# Patient Record
Sex: MALE | Race: WHITE | NOT HISPANIC OR LATINO | URBAN - METROPOLITAN AREA
[De-identification: names, ages, dates, MRNs, and addresses within clinical notes are randomized per-mention and may not be internally consistent; named-entity substitution may affect disease eponyms.]

---

## 2019-11-27 ENCOUNTER — OFFICE (OUTPATIENT)
Dept: URBAN - METROPOLITAN AREA CLINIC 45 | Facility: CLINIC | Age: 64
End: 2019-11-27

## 2019-11-27 VITALS
DIASTOLIC BLOOD PRESSURE: 87 MMHG | HEART RATE: 78 BPM | SYSTOLIC BLOOD PRESSURE: 128 MMHG | HEIGHT: 69 IN | WEIGHT: 190 LBS

## 2019-11-27 DIAGNOSIS — R94.5: ICD-10-CM

## 2019-11-27 DIAGNOSIS — M54.5: ICD-10-CM

## 2019-11-27 DIAGNOSIS — D64.9 ANEMIA, UNSPECIFIED: ICD-10-CM

## 2019-11-27 DIAGNOSIS — E66.3 OVERWEIGHT: ICD-10-CM

## 2019-11-27 DIAGNOSIS — R14.0 ABDOMINAL BLOATING: ICD-10-CM

## 2019-11-27 DIAGNOSIS — R10.30: ICD-10-CM

## 2019-11-27 PROCEDURE — 99205 OFFICE O/P NEW HI 60 MIN: CPT | Performed by: INTERNAL MEDICINE

## 2019-11-27 RX ORDER — DICYCLOMINE HYDROCHLORIDE 20 MG/1
TABLET ORAL
Qty: 60 | Status: ACTIVE

## 2019-11-27 NOTE — SERVICEHPINOTES
The patient complains of abdominal pain.    Symptoms began fairly acutely and developed gradually over last   several  weeks   ago following lower back surgery performed on October 30, with onset that was    gradual  .    It is localized as mostly right   lower abdomen and periumbilical area   pain.    It is described as   mild or moderate   in nature.   Pain quality is described as mainly   dull and constant  .    It also at times radiates to the   back  .    Discomfort typically lasts   many  minutes   , and has a course which is   .   It usually starts   intermittently  .    Symptom triggers include   positional/posture change  .  Alleviating factors include   nothing specific  .    Symptoms have been overall   non-progressive/stable   since onset.    Alarm features noted:   none  .   The patient also reports a sensation of   abdominal bloating/distension. Patient developed acute alteration of bowel habit and constipation following surgery  .      Symptoms have caused the patient to   see primary care physician and visit the ER  , where he had fairly noncontributory lab work, except slight elevation of transaminases and mild anemia with hemoglobin of 11. Patient has had CT scan of the abdomen done with contrasts, which only demonstrated nonspecific fluid around slightly dilated small intestine suggestive of postoperative ileus.    Similar symptoms    occurred in the past, associated with    .   Noteworthy prior abdominal interventions include recent recurrent   lower back surgery for herniated disc disease  .   has been performed previously to evaluate the patient's symptoms.   Remedies tried to date include     with    .    Other pertinent testing to date includes,    There has been no previous colon screening. The patient has no family history of colon cancer or other significant GI diseases. Patient denies fever, nausea, vomiting, dysphagia, reflux, diarrhea, rectal bleeding, melena, and significant change in weight. Denies shortness of breath and chest pain.

## 2019-11-29 LAB
CBC (INCLUDES DIFF/PLT): ABSOLUTE BAND NEUTROPHILS: NORMAL CELLS/UL
CBC (INCLUDES DIFF/PLT): ABSOLUTE BASOPHILS: 53 CELLS/UL (ref 0–200)
CBC (INCLUDES DIFF/PLT): ABSOLUTE BLASTS: NORMAL CELLS/UL
CBC (INCLUDES DIFF/PLT): ABSOLUTE EOSINOPHILS: 238 CELLS/UL (ref 15–500)
CBC (INCLUDES DIFF/PLT): ABSOLUTE LYMPHOCYTES: 2020 CELLS/UL (ref 850–3900)
CBC (INCLUDES DIFF/PLT): ABSOLUTE METAMYELOCYTES: NORMAL CELLS/UL
CBC (INCLUDES DIFF/PLT): ABSOLUTE MONOCYTES: 667 CELLS/UL (ref 200–950)
CBC (INCLUDES DIFF/PLT): ABSOLUTE MYELOCYTES: NORMAL CELLS/UL
CBC (INCLUDES DIFF/PLT): ABSOLUTE NEUTROPHILS: 3623 CELLS/UL (ref 1500–7800)
CBC (INCLUDES DIFF/PLT): ABSOLUTE NUCLEATED RBC: 0 CELLS/UL (ref 0–?)
CBC (INCLUDES DIFF/PLT): ABSOLUTE PROMYELOCYTES: NORMAL CELLS/UL
CBC (INCLUDES DIFF/PLT): BAND NEUTROPHILS: NORMAL %
CBC (INCLUDES DIFF/PLT): BASOPHILS: 0.8 %
CBC (INCLUDES DIFF/PLT): BLASTS: NORMAL %
CBC (INCLUDES DIFF/PLT): COMMENT(S): NORMAL
CBC (INCLUDES DIFF/PLT): EOSINOPHILS: 3.6 %
CBC (INCLUDES DIFF/PLT): HEMATOCRIT: 36.1 % — LOW (ref 38.5–50)
CBC (INCLUDES DIFF/PLT): HEMOGLOBIN: 12.4 G/DL — LOW (ref 13.2–17.1)
CBC (INCLUDES DIFF/PLT): LYMPHOCYTES: 30.6 %
CBC (INCLUDES DIFF/PLT): MCH: 29.3 PG (ref 27–33)
CBC (INCLUDES DIFF/PLT): MCHC: 34.3 G/DL (ref 32–36)
CBC (INCLUDES DIFF/PLT): MCV: 85.3 FL (ref 80–100)
CBC (INCLUDES DIFF/PLT): METAMYELOCYTES: NORMAL %
CBC (INCLUDES DIFF/PLT): MONOCYTES: 10.1 %
CBC (INCLUDES DIFF/PLT): MPV: 10 FL (ref 7.5–12.5)
CBC (INCLUDES DIFF/PLT): MYELOCYTES: NORMAL %
CBC (INCLUDES DIFF/PLT): NEUTROPHILS: 54.9 %
CBC (INCLUDES DIFF/PLT): NUCLEATED RBC: NORMAL /100 WBC
CBC (INCLUDES DIFF/PLT): PLATELET COUNT: 306 THOUSAND/UL (ref 140–400)
CBC (INCLUDES DIFF/PLT): PROMYELOCYTES: NORMAL %
CBC (INCLUDES DIFF/PLT): RDW: 13 % (ref 11–15)
CBC (INCLUDES DIFF/PLT): REACTIVE LYMPHOCYTES: NORMAL %
CBC (INCLUDES DIFF/PLT): RED BLOOD CELL COUNT: 4.23 MILLION/UL (ref 4.2–5.8)
CBC (INCLUDES DIFF/PLT): WHITE BLOOD CELL COUNT: 6.6 THOUSAND/UL (ref 3.8–10.8)
CEA: <0.5 NG/ML
COMPREHENSIVE METABOLIC PANEL: ALBUMIN/GLOBULIN RATIO: 1.2 (CALC) (ref 1–2.5)
COMPREHENSIVE METABOLIC PANEL: ALBUMIN: 4 G/DL (ref 3.6–5.1)
COMPREHENSIVE METABOLIC PANEL: ALKALINE PHOSPHATASE: 217 U/L — HIGH (ref 40–115)
COMPREHENSIVE METABOLIC PANEL: ALT: 38 U/L (ref 9–46)
COMPREHENSIVE METABOLIC PANEL: AST: 20 U/L (ref 10–35)
COMPREHENSIVE METABOLIC PANEL: BILIRUBIN, TOTAL: 0.4 MG/DL (ref 0.2–1.2)
COMPREHENSIVE METABOLIC PANEL: BUN/CREATININE RATIO: (no result) (CALC)
COMPREHENSIVE METABOLIC PANEL: CALCIUM: 9.3 MG/DL (ref 8.6–10.3)
COMPREHENSIVE METABOLIC PANEL: CARBON DIOXIDE: 28 MMOL/L (ref 20–32)
COMPREHENSIVE METABOLIC PANEL: CHLORIDE: 107 MMOL/L (ref 98–110)
COMPREHENSIVE METABOLIC PANEL: CREATININE: 0.8 MG/DL (ref 0.7–1.25)
COMPREHENSIVE METABOLIC PANEL: EGFR AFRICAN AMERICAN: 109 ML/MIN/1.73M2 (ref 60–?)
COMPREHENSIVE METABOLIC PANEL: EGFR NON-AFR. AMERICAN: 94 ML/MIN/1.73M2 (ref 60–?)
COMPREHENSIVE METABOLIC PANEL: GLOBULIN: 3.3 G/DL (CALC) (ref 1.9–3.7)
COMPREHENSIVE METABOLIC PANEL: GLUCOSE: 102 MG/DL (ref 65–139)
COMPREHENSIVE METABOLIC PANEL: POTASSIUM: 4.8 MMOL/L (ref 3.5–5.3)
COMPREHENSIVE METABOLIC PANEL: PROTEIN, TOTAL: 7.3 G/DL (ref 6.1–8.1)
COMPREHENSIVE METABOLIC PANEL: SODIUM: 142 MMOL/L (ref 135–146)
COMPREHENSIVE METABOLIC PANEL: UREA NITROGEN (BUN): 11 MG/DL (ref 7–25)
FERRITIN: 63 NG/ML (ref 24–380)
HEPATITIS PANEL, ACUTE W/REFLEX TO CONFIRMATION: CONFIRMATION: NORMAL
HEPATITIS PANEL, ACUTE W/REFLEX TO CONFIRMATION: HEPATITIS A IGM: NORMAL
HEPATITIS PANEL, ACUTE W/REFLEX TO CONFIRMATION: HEPATITIS B CORE ANTIBODY (IGM): NORMAL
HEPATITIS PANEL, ACUTE W/REFLEX TO CONFIRMATION: HEPATITIS B SURFACE ANTIGEN: NORMAL
HEPATITIS PANEL, ACUTE W/REFLEX TO CONFIRMATION: HEPATITIS C ANTIBODY: NORMAL
HEPATITIS PANEL, ACUTE W/REFLEX TO CONFIRMATION: SIGNAL TO CUT-OFF: 0.03 (ref ?–1)
IRON AND TOTAL IRON BINDING CAPACITY: % SATURATION: 14 % (CALC) — LOW (ref 20–48)
IRON AND TOTAL IRON BINDING CAPACITY: IRON BINDING CAPACITY: 337 MCG/DL (CALC) (ref 250–425)
IRON AND TOTAL IRON BINDING CAPACITY: IRON, TOTAL: 46 MCG/DL — LOW (ref 50–180)
VITAMIN B12: 590 PG/ML (ref 200–1100)

## 2019-12-12 ENCOUNTER — HOSPITAL ENCOUNTER (EMERGENCY)
Dept: HOSPITAL 72 - EMR | Age: 64
Discharge: HOME | End: 2019-12-12
Payer: MEDICARE

## 2019-12-12 VITALS — DIASTOLIC BLOOD PRESSURE: 72 MMHG | SYSTOLIC BLOOD PRESSURE: 119 MMHG

## 2019-12-12 VITALS — SYSTOLIC BLOOD PRESSURE: 156 MMHG | DIASTOLIC BLOOD PRESSURE: 51 MMHG

## 2019-12-12 VITALS — HEIGHT: 70 IN | BODY MASS INDEX: 27.2 KG/M2 | WEIGHT: 190 LBS

## 2019-12-12 DIAGNOSIS — Z98.1: ICD-10-CM

## 2019-12-12 DIAGNOSIS — R55: Primary | ICD-10-CM

## 2019-12-12 LAB
ADD MANUAL DIFF: NO
ALBUMIN SERPL-MCNC: 3.1 G/DL (ref 3.4–5)
ALBUMIN/GLOB SERPL: 0.8 {RATIO} (ref 1–2.7)
ALP SERPL-CCNC: 206 U/L (ref 46–116)
ALT SERPL-CCNC: 40 U/L (ref 12–78)
ANION GAP SERPL CALC-SCNC: 9 MMOL/L (ref 5–15)
APPEARANCE UR: CLEAR
APTT PPP: YELLOW S
AST SERPL-CCNC: 22 U/L (ref 15–37)
BASOPHILS NFR BLD AUTO: 0.7 % (ref 0–2)
BILIRUB SERPL-MCNC: 0.2 MG/DL (ref 0.2–1)
BUN SERPL-MCNC: 13 MG/DL (ref 7–18)
CALCIUM SERPL-MCNC: 8.5 MG/DL (ref 8.5–10.1)
CHLORIDE SERPL-SCNC: 107 MMOL/L (ref 98–107)
CK MB SERPL-MCNC: 0.5 NG/ML (ref 0–3.6)
CK SERPL-CCNC: 46 U/L (ref 26–308)
CO2 SERPL-SCNC: 24 MMOL/L (ref 21–32)
CREAT SERPL-MCNC: 0.8 MG/DL (ref 0.55–1.3)
EOSINOPHIL NFR BLD AUTO: 1.6 % (ref 0–3)
ERYTHROCYTE [DISTWIDTH] IN BLOOD BY AUTOMATED COUNT: 11.2 % (ref 11.6–14.8)
GLOBULIN SER-MCNC: 3.8 G/DL
GLUCOSE UR STRIP-MCNC: NEGATIVE MG/DL
HCT VFR BLD CALC: 35.6 % (ref 42–52)
HGB BLD-MCNC: 12.6 G/DL (ref 14.2–18)
KETONES UR QL STRIP: NEGATIVE
LEUKOCYTE ESTERASE UR QL STRIP: NEGATIVE
LYMPHOCYTES NFR BLD AUTO: 22.2 % (ref 20–45)
MCV RBC AUTO: 85 FL (ref 80–99)
MONOCYTES NFR BLD AUTO: 7.3 % (ref 1–10)
NEUTROPHILS NFR BLD AUTO: 68.2 % (ref 45–75)
NITRITE UR QL STRIP: NEGATIVE
PH UR STRIP: 6.5 [PH] (ref 4.5–8)
PLATELET # BLD: 337 K/UL (ref 150–450)
POTASSIUM SERPL-SCNC: 4.8 MMOL/L (ref 3.5–5.1)
PROT UR QL STRIP: NEGATIVE
RBC # BLD AUTO: 4.2 M/UL (ref 4.7–6.1)
SODIUM SERPL-SCNC: 140 MMOL/L (ref 136–145)
SP GR UR STRIP: 1.01 (ref 1–1.03)
UROBILINOGEN UR-MCNC: 1 MG/DL (ref 0–1)
WBC # BLD AUTO: 7.9 K/UL (ref 4.8–10.8)

## 2019-12-12 PROCEDURE — 81003 URINALYSIS AUTO W/O SCOPE: CPT

## 2019-12-12 PROCEDURE — 82553 CREATINE MB FRACTION: CPT

## 2019-12-12 PROCEDURE — 99284 EMERGENCY DEPT VISIT MOD MDM: CPT

## 2019-12-12 PROCEDURE — 36415 COLL VENOUS BLD VENIPUNCTURE: CPT

## 2019-12-12 PROCEDURE — 85025 COMPLETE CBC W/AUTO DIFF WBC: CPT

## 2019-12-12 PROCEDURE — 80053 COMPREHEN METABOLIC PANEL: CPT

## 2019-12-12 PROCEDURE — 83605 ASSAY OF LACTIC ACID: CPT

## 2019-12-12 PROCEDURE — 82550 ASSAY OF CK (CPK): CPT

## 2019-12-12 PROCEDURE — 87040 BLOOD CULTURE FOR BACTERIA: CPT

## 2019-12-12 PROCEDURE — 84484 ASSAY OF TROPONIN QUANT: CPT

## 2019-12-12 PROCEDURE — 93005 ELECTROCARDIOGRAM TRACING: CPT

## 2019-12-12 PROCEDURE — 71045 X-RAY EXAM CHEST 1 VIEW: CPT

## 2019-12-12 PROCEDURE — 96360 HYDRATION IV INFUSION INIT: CPT

## 2019-12-12 NOTE — NUR
ED Nurse Note:



pt brought in to ER from doctor's office by ambulance due to near syncope and 
sudden dizziness. pt went to doctor's office for 6 weeks post op follow up of 
spinal surgery and had sudden dizziness and near syncope and called 911. per 
EMS systolic was lower than 100 at the field but it went up to 110 upon arrival 
at triage. pt aao x4 and ambulatory but bedridden at this moment due to 
fatigue. calm and cooperative. skin clean and intact. no cardiac or pulmonary 
distress noted. BP noted at 119/72mmHg at bedside. pt is in gown and on cardiac 
monitor.

## 2019-12-12 NOTE — NUR
ER DISCHARGE NOTE:

Patient is cleared to be discharged home per ERMD, pt is aox4, on room air, 
with stable vital signs. pt was given dc instructions, no medications 
prescribed, pt was able to verbalize understanding, pt id band and iv site 
removed without complications. pt left ER in stable condition in a wheelchair 
accompanied by one ER tech. pt took all belongings.

## 2019-12-12 NOTE — EMERGENCY ROOM REPORT
History of Present Illness


General


Chief Complaint:  Dizziness


Source:  Patient





Present Illness


HPI


Patient presents with episode of lightheadedness and near syncopal episode


Patient was at his back specialist office patient is 6 weeks out from major low 

back fusion/revision surgery


Patient was reportedly walking around when he became lightheaded weak


Blood pressure was found to be low in the 80s


Patient also had become diaphoretic


And paramedics were summoned


Patient also has comorbidities including cardiac disease with previous


ACS and stent placement





Upon arrival denies any chest pain denies any nausea


Allergies:  


Coded Allergies:  


     No Known Allergies (Unverified , 12/12/19)





Patient History


Past Medical History:  see triage record


Reviewed Nursing Documentation:  PMH: Agreed; PSxH: Agreed





Nursing Documentation-PMH


Hx Cardiac Problems:  Yes - stent surgery in 2017, spinal surgery 6 weeks ago





Review of Systems


All Other Systems:  negative except mentioned in HPI





Physical Exam





Vital Signs








  Date Time  Temp Pulse Resp B/P (MAP) Pulse Ox O2 Delivery O2 Flow Rate FiO2


 


12/12/19 14:32 98.6 68 17 113/76 (88) 98 Room Air  








Sp02 EP Interpretation:  reviewed, normal


General Appearance:  no apparent distress


Head:  normocephalic, atraumatic


Eyes:  bilateral eye PERRL, bilateral eye EOMI


ENT:  EOM grossly intact


Neck:  supple, thyroid normal


Respiratory:  lungs clear, no respiratory distress, no retraction


Cardiovascular #1:  regular rate, rhythm


Gastrointestinal:  non tender, soft


Musculoskeletal:  normal inspection


Neurologic:  alert, oriented x3


Psychiatric:  normal inspection


Skin:  no rash, palpation normal


Lymphatic:  no adenopathy





Medical Decision Making


Diagnostic Impression:  


 Primary Impression:  


 Near syncope


ER Course


Patient was having a follow-up for his multilevel revision back surgery


Family reports that the patient had been walking and standing longer than usual 

recently patient does report feeling lightheaded and diaphoretic


And from the reports


Sounds to have had a near syncopal episode patient denies any full lapse of 

consciousness








Multiple differentials are consideration including but not limited to cardiac 

cardiopulmonary,, infectious, vascular process patient at this time is 

hemodynamically stable


Initially was found to be hypotensive and with a small bolus of fluids has been 

over 100 systolic








Patient observed for prolonged period time I did make contact with the patient'

s surgical specialist they do not feel that this is likely related to any 

surgical intervention





Patient does have cardiac risk factors and previous history at this time


Of blood work returning at baseline levels EKG is normal


On repeat eval patient continues to feel well





We discussed possible further inpatient observation versus outpatient care 

patient reports that he feels well and comfortable with outpatient follow-up








Surgical specialist PA / Rn contacted at [phone]





Labs








Test


  12/12/19


15:20 12/12/19


15:25 12/12/19


16:10


 


Sodium Level


  140 MMOL/L


(136-145) 


  


 


 


Potassium Level


  4.8 MMOL/L


(3.5-5.1) 


  


 


 


Chloride Level


  107 MMOL/L


() 


  


 


 


Carbon Dioxide Level


  24 MMOL/L


(21-32) 


  


 


 


Anion Gap


  9 mmol/L


(5-15) 


  


 


 


Blood Urea Nitrogen


  13 mg/dL


(7-18) 


  


 


 


Creatinine


  0.8 MG/DL


(0.55-1.30) 


  


 


 


Estimat Glomerular Filtration


Rate > 60 mL/min


(>60) 


  


 


 


Glucose Level


  156 MG/DL


() 


  


 


 


Lactic Acid Level


  1.30 mmol/L


(0.4-2.0) 


  


 


 


Calcium Level


  8.5 MG/DL


(8.5-10.1) 


  


 


 


Total Bilirubin


  0.2 MG/DL


(0.2-1.0) 


  


 


 


Aspartate Amino Transf


(AST/SGOT) 22 U/L (15-37) 


  


  


 


 


Alanine Aminotransferase


(ALT/SGPT) 40 U/L (12-78) 


  


  


 


 


Alkaline Phosphatase


  206 U/L


() 


  


 


 


Total Creatine Kinase


  46 U/L


() 


  


 


 


Creatine Kinase MB


  0.5 NG/ML


(0.0-3.6) 


  


 


 


Creatine Kinase MB Relative


Index 1.0 


  


  


 


 


Troponin I


  0.000 ng/mL


(0.000-0.056) 


  


 


 


Total Protein


  6.9 G/DL


(6.4-8.2) 


  


 


 


Albumin


  3.1 G/DL


(3.4-5.0) 


  


 


 


Globulin 3.8 g/dL   


 


Albumin/Globulin Ratio 0.8 (1.0-2.7)   


 


White Blood Count


  


  7.9 K/UL


(4.8-10.8) 


 


 


Red Blood Count


  


  4.20 M/UL


(4.70-6.10) 


 


 


Hemoglobin


  


  12.6 G/DL


(14.2-18.0) 


 


 


Hematocrit


  


  35.6 %


(42.0-52.0) 


 


 


Mean Corpuscular Volume  85 FL (80-99)  


 


Mean Corpuscular Hemoglobin


  


  29.9 PG


(27.0-31.0) 


 


 


Mean Corpuscular Hemoglobin


Concent 


  35.3 G/DL


(32.0-36.0) 


 


 


Red Cell Distribution Width


  


  11.2 %


(11.6-14.8) 


 


 


Platelet Count


  


  337 K/UL


(150-450) 


 


 


Mean Platelet Volume


  


  5.7 FL


(6.5-10.1) 


 


 


Neutrophils (%) (Auto)


  


  68.2 %


(45.0-75.0) 


 


 


Lymphocytes (%) (Auto)


  


  22.2 %


(20.0-45.0) 


 


 


Monocytes (%) (Auto)


  


  7.3 %


(1.0-10.0) 


 


 


Eosinophils (%) (Auto)


  


  1.6 %


(0.0-3.0) 


 


 


Basophils (%) (Auto)


  


  0.7 %


(0.0-2.0) 


 


 


Urine Color   Yellow 


 


Urine Appearance   Clear 


 


Urine pH   6.5 (4.5-8.0) 


 


Urine Specific Gravity


  


  


  1.010


(1.005-1.035)


 


Urine Protein


  


  


  Negative


(NEGATIVE)


 


Urine Glucose (UA)


  


  


  Negative


(NEGATIVE)


 


Urine Ketones


  


  


  Negative


(NEGATIVE)


 


Urine Blood


  


  


  Negative


(NEGATIVE)


 


Urine Nitrite


  


  


  Negative


(NEGATIVE)


 


Urine Bilirubin


  


  


  Negative


(NEGATIVE)


 


Urine Urobilinogen


  


  


  1 MG/DL


(0.0-1.0)


 


Urine Leukocyte Esterase


  


  


  Negative


(NEGATIVE)








EKG Diagnostic Results


Rate:  normal


Rhythm:  NSR


ST Segments:  no acute changes





Rhythm Strip Diag. Results


EP Interpretation:  yes


Rate:  77


Rhythm:  NSR, no PVC's, no ectopy





Chest X-Ray Diagnostic Results


Chest X-Ray Diagnostic Results :  


   Chest X-Ray Ordered:  Yes


   # of Views/Limited/Complete:  1 View


   Indication:  Chest Pain


   EP Interpretation:  Yes


   Interpretation:  no consolidation, no effusion, no pneumothorax


   Impression:  No acute disease


   Electronically Signed by:  Meng Chinchilla DO





Last Vital Signs








  Date Time  Temp Pulse Resp B/P (MAP) Pulse Ox O2 Delivery O2 Flow Rate FiO2


 


12/12/19 15:00  68 17   Room Air  


 


12/12/19 15:00 98.6   119/72 98   








Status:  improved


Disposition:  HOME, SELF-CARE


Condition:  Improved





Additional Instructions:  


Patient is provided with the discharge instructions notified to follow up with 

primary doctor in the next 2-3 days otherwise return to the er with any 

worsening symptoms.


Please note that this report is being documented using DRAGON technology.  This 

can lead to erroneous entry secondary to incorrect interpretation by the 

dictating instrument.











Meng Chinchilla DO Dec 12, 2019 15:19

## 2019-12-13 NOTE — DIAGNOSTIC IMAGING REPORT
Indication: Chest pain

 

Technique: XRAY Chest 1v

 

Comparison: None

 

Findings: Heart size and mediastinal contours are within normal limits for AP

technique. There is no focal airspace consolidation, pneumothorax or pleural

effusion. There are degenerative changes of the spine. Lower thoracic and lumbar

fixation hardware is partially visualized. Osseous structures demonstrate no acute

abnormality.

 

Impression: No radiographic evidence of acute cardiopulmonary disease.

## 2019-12-14 NOTE — CARDIOLOGY REPORT
--------------- APPROVED REPORT --------------





EKG Measurement

Heart [id number]

[id number]

[id number]

[id number]

[id number]





Normal sinus rhythm

Normal ECG